# Patient Record
Sex: FEMALE | Race: WHITE | NOT HISPANIC OR LATINO
[De-identification: names, ages, dates, MRNs, and addresses within clinical notes are randomized per-mention and may not be internally consistent; named-entity substitution may affect disease eponyms.]

---

## 2018-05-23 ENCOUNTER — RECORD ABSTRACTING (OUTPATIENT)
Age: 73
End: 2018-05-23

## 2018-05-23 PROBLEM — Z00.00 ENCOUNTER FOR PREVENTIVE HEALTH EXAMINATION: Status: ACTIVE | Noted: 2018-05-23

## 2018-05-23 RX ORDER — FLUTICASONE FUROATE, UMECLIDINIUM BROMIDE AND VILANTEROL TRIFENATATE 200; 62.5; 25 UG/1; UG/1; UG/1
POWDER RESPIRATORY (INHALATION)
Refills: 0 | Status: ACTIVE | COMMUNITY

## 2018-05-23 RX ORDER — PSEUDOEPHEDRINE HCL 30 MG
TABLET ORAL
Refills: 0 | Status: ACTIVE | COMMUNITY

## 2018-05-23 RX ORDER — MULTIVITAMIN
TABLET ORAL
Refills: 0 | Status: ACTIVE | COMMUNITY

## 2018-05-23 RX ORDER — MULTIVIT-MIN/FOLIC/VIT K/LYCOP 400-300MCG
25 MCG TABLET ORAL
Refills: 0 | Status: ACTIVE | COMMUNITY

## 2018-05-23 RX ORDER — CYANOCOBALAMIN (VITAMIN B-12) 500 MCG
400 LOZENGE ORAL
Refills: 0 | Status: ACTIVE | COMMUNITY

## 2018-05-23 RX ORDER — MULTIVIT-MIN/FOLIC/VIT K/LYCOP 400-300MCG
1000 TABLET ORAL
Refills: 0 | Status: ACTIVE | COMMUNITY

## 2018-06-26 ENCOUNTER — APPOINTMENT (OUTPATIENT)
Dept: BREAST CENTER | Facility: CLINIC | Age: 73
End: 2018-06-26
Payer: MEDICARE

## 2018-06-26 VITALS
DIASTOLIC BLOOD PRESSURE: 72 MMHG | SYSTOLIC BLOOD PRESSURE: 127 MMHG | HEIGHT: 65 IN | WEIGHT: 180 LBS | HEART RATE: 75 BPM | BODY MASS INDEX: 29.99 KG/M2

## 2018-06-26 DIAGNOSIS — Z87.19 PERSONAL HISTORY OF OTHER DISEASES OF THE DIGESTIVE SYSTEM: ICD-10-CM

## 2018-06-26 DIAGNOSIS — R92.8 OTHER ABNORMAL AND INCONCLUSIVE FINDINGS ON DIAGNOSTIC IMAGING OF BREAST: ICD-10-CM

## 2018-06-26 DIAGNOSIS — Z86.39 PERSONAL HISTORY OF OTHER ENDOCRINE, NUTRITIONAL AND METABOLIC DISEASE: ICD-10-CM

## 2018-06-26 PROCEDURE — 99215 OFFICE O/P EST HI 40 MIN: CPT

## 2018-06-26 RX ORDER — METHYLPREDNISOLONE 4 MG/1
4 TABLET ORAL
Qty: 21 | Refills: 0 | Status: DISCONTINUED | COMMUNITY
Start: 2018-01-09

## 2018-06-26 RX ORDER — AMOXICILLIN 500 MG/1
500 TABLET, FILM COATED ORAL
Qty: 21 | Refills: 0 | Status: DISCONTINUED | COMMUNITY
Start: 2018-01-09

## 2018-06-26 RX ORDER — CEFDINIR 300 MG/1
300 CAPSULE ORAL
Qty: 20 | Refills: 0 | Status: DISCONTINUED | COMMUNITY
Start: 2018-01-12

## 2018-06-26 RX ORDER — AZITHROMYCIN 250 MG/1
250 TABLET, FILM COATED ORAL
Qty: 6 | Refills: 0 | Status: DISCONTINUED | COMMUNITY
Start: 2018-03-21

## 2018-06-26 RX ORDER — FOSINOPRIL SODIUM 10 MG/1
10 TABLET ORAL
Qty: 90 | Refills: 0 | Status: DISCONTINUED | COMMUNITY
Start: 2018-01-16

## 2018-06-26 RX ORDER — PREDNISONE 20 MG/1
20 TABLET ORAL
Qty: 10 | Refills: 0 | Status: DISCONTINUED | COMMUNITY
Start: 2018-04-24

## 2018-10-11 ENCOUNTER — APPOINTMENT (OUTPATIENT)
Dept: BREAST CENTER | Facility: CLINIC | Age: 73
End: 2018-10-11
Payer: MEDICARE

## 2018-10-11 VITALS
SYSTOLIC BLOOD PRESSURE: 140 MMHG | BODY MASS INDEX: 29.99 KG/M2 | HEART RATE: 90 BPM | OXYGEN SATURATION: 98 % | DIASTOLIC BLOOD PRESSURE: 86 MMHG | WEIGHT: 180 LBS | HEIGHT: 65 IN | RESPIRATION RATE: 18 BRPM

## 2018-10-11 PROCEDURE — 99213 OFFICE O/P EST LOW 20 MIN: CPT

## 2019-03-04 NOTE — REVIEW OF SYSTEMS
[Recent Weight Gain (___ Lbs)] : recent [unfilled] ~Ulb weight gain [Hoarseness] : hoarseness [Shortness Of Breath] : shortness of breath [Wheezing] : wheezing [Heartburn] : heartburn [Negative] : Heme/Lymph

## 2019-03-05 ENCOUNTER — APPOINTMENT (OUTPATIENT)
Dept: BREAST CENTER | Facility: CLINIC | Age: 74
End: 2019-03-05
Payer: MEDICARE

## 2019-03-05 VITALS
WEIGHT: 180 LBS | DIASTOLIC BLOOD PRESSURE: 77 MMHG | SYSTOLIC BLOOD PRESSURE: 135 MMHG | HEIGHT: 65 IN | BODY MASS INDEX: 29.99 KG/M2 | HEART RATE: 86 BPM

## 2019-03-05 PROCEDURE — 99215 OFFICE O/P EST HI 40 MIN: CPT

## 2019-03-05 NOTE — HISTORY OF PRESENT ILLNESS
[FreeTextEntry1] : This is a 73 year old female s/p right partial mastectomy , SNE, IORT and symmetrizing lift (Dr. 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 No residual tumor seen on the re-excision with all new margins >1cm.  The associated DCIS was intermediate grade and 1.6cm. She is s/p whole breast RTX (Dr. Easley). She is on anastrazole (Dr. Walker).  She was treated for chronic bronchitis and has been diagnosed with COPD. She s/p lobectomy (Dr. Michelle) on 10/05/2017. Patient had a PET scan on 09/07/2017, showing intermediate uptake in the right lateral breast, a right ultrasound was done on 12/21/2017. This right usd showed no appreciable change allowing for differences in technique and imaging modality (recent CT Chest) in the lobulated mixed echogenicity mass in the right breast 9:00 4cm from the nipple, favored to reflect fat necrosis/seroma.\par \par Patient was seen by Medical Oncologist (Dr. Walker) on 09/08/2017.  Patient is very anxious regarding her PET/CT results, having had a lesion seen on her lung and then eventually having lung cancer. She was adamant that she didn't have a PET/CT scan in September even after reviewing the written report. She later then reviewed her own calendar and then stated that she did have a PET/CT scan in [facility] and that she was wrong. She is very anxious given her history of lung cancer and breast cancer. \par \par She presents today for follow up and states her right breast pain/soreness is markedly improved on its own. She states she stopped all her vitamins for the last month before her blood work. She would like to know what vitamins to take and that she is very anxious about getting another cancer and would like another PET/CT scan. \par \par She states her right seroma has been tender through the fall and she thinks this is related to her increased exercise she is doing for her lungs.\par She does not perform SBE. \par She has not noticed a change in her breast or a breast lump. No change in right breast seroma. Patient noticed a new right breast lump. \par She has not noticed a change in her nipple or nipple area.\par She has not noticed a change in the skin of the breast.\par She is not experiencing nipple discharge.\par She is not experiencing breast pain. \par She has not noticed a lump or lymph node under the armpit. \par \par BREAST CANCER RISK FACTORS\par Menarche: 13\par Menopause: 41\par Grav:  2    Para: 2\par Age at first live birth: 25\par Nursed: No\par Hysterectomy: No\par Oophorectomy: No\par OCP: Yes x 10y\par HRT: No\par Last pap/pelvic exam: 2012 WNL\par Related family history: No\par Ashkenazi: Yes\par Tyrer-Allison/NCI lifetime risk: n/a \par BRCA testing: Yes -  negative \par \par Last mammogram:    June 13, 2018  Location: [facility]\par Report reviewed.                             \par Results: Birads 2\par No evidence of malignancy\par \par Last ultrasound: June 13, 2018                      Location: [facility] Report reviewed.                          \par Results: Birads 3\par No significant interval change in the oval mixed echogenicity mass in the right breast 9:00 4 cm from the nipple. It measures 4.2 x 1.8 3 cm.Stable since Der 2017. Right breast 7:00 one CM from the nipplesmall round circumscribed mass compatible with small oil cysts, unchanged.\par left breast 4:00 2 cm from the nipple round circumscribed hypoechoic mass measuring 4 mm in diameter unchanged from 2016 compatible with a small oil cyst.\par \par Last MRI:       10/18/18                                          Location: \par report reviewed. \par results:\par BIRADS 2\par No suspicious findings.\par

## 2019-03-05 NOTE — ASSESSMENT
[FreeTextEntry1] : This is a 73-year-old female with history of right breast cancer stage IA invasive ductal carcinoma and DCIS, ASMITA\par Discussed the importance of gynecologic screening\par Continue Arimidex and surveillance per Dr. Walker\par Plan bilateral mammogram ultrasound June 2019\par Advised her to discuss PET/CT scanning as a form of screening with Dr. Walker, I reassured her that her blood work is normal and that this is also a form of screening\par Followup with me June 2019\par rec heating pad and vitamin e oil to her right seroma prn\par will discuss genetic panel testing with holly hanna\par Encouraged her to see a gastroenterologist regarding abdominal bloating, I do not think it is due from Arimidex. She also reports weight gain.\par We reviewed risk reduction strategies including maintaining a BMI <25, limiting red meat intake and alcoholic beverages to 3 per week and exercise (150 min/ week low intensity or 75 min/week high intensity). And maintaining a normal vitamin D level.\par \par She knows to call or return sooner should any concerns or questions arise.\par

## 2019-03-05 NOTE — PHYSICAL EXAM
[Normocephalic] : normocephalic [Atraumatic] : atraumatic [Supple] : supple [No Supraclavicular Adenopathy] : no supraclavicular adenopathy [Examined in the supine and seated position] : examined in the supine and seated position [No dominant masses] : no dominant masses in right breast  [No dominant masses] : no dominant masses left breast [No Nipple Retraction] : no left nipple retraction [No Nipple Discharge] : no left nipple discharge [No Axillary Lymphadenopathy] : no left axillary lymphadenopathy [Soft] : abdomen soft [Not Tender] : non-tender [No Palpable Masses] : no abdominal mass palpated [No Edema] : no edema [No Rashes] : no rashes [No Ulceration] : no ulceration [Symmetrical] : symmetrical [de-identified] : Status post partial mastectomy, moderate brawny induration consistent with radiation changes, lower outer quadrant seroma approximately 3 cm, slightly tender no other palpable masses [de-identified] : healed mastopexy [de-identified] : Healed axillary incision [de-identified] : Very tan

## 2019-03-28 ENCOUNTER — CLINICAL ADVICE (OUTPATIENT)
Age: 74
End: 2019-03-28

## 2019-03-29 ENCOUNTER — TRANSCRIPTION ENCOUNTER (OUTPATIENT)
Age: 74
End: 2019-03-29

## 2019-06-18 ENCOUNTER — APPOINTMENT (OUTPATIENT)
Dept: BREAST CENTER | Facility: CLINIC | Age: 74
End: 2019-06-18
Payer: MEDICARE

## 2019-06-18 VITALS
WEIGHT: 190 LBS | DIASTOLIC BLOOD PRESSURE: 77 MMHG | HEIGHT: 65 IN | SYSTOLIC BLOOD PRESSURE: 122 MMHG | BODY MASS INDEX: 31.65 KG/M2 | HEART RATE: 87 BPM

## 2019-06-18 DIAGNOSIS — Z86.79 PERSONAL HISTORY OF OTHER DISEASES OF THE CIRCULATORY SYSTEM: ICD-10-CM

## 2019-06-18 DIAGNOSIS — T81.9XXA UNSPECIFIED COMPLICATION OF PROCEDURE, INITIAL ENCOUNTER: ICD-10-CM

## 2019-06-18 DIAGNOSIS — D78.33: ICD-10-CM

## 2019-06-18 PROCEDURE — 99215 OFFICE O/P EST HI 40 MIN: CPT

## 2019-06-18 RX ORDER — CITALOPRAM HYDROBROMIDE 20 MG/1
20 TABLET, FILM COATED ORAL DAILY
Refills: 0 | Status: DISCONTINUED | COMMUNITY
End: 2019-06-18

## 2019-06-18 RX ORDER — LISINOPRIL 30 MG/1
TABLET ORAL
Refills: 0 | Status: DISCONTINUED | COMMUNITY
End: 2019-06-18

## 2019-06-18 RX ORDER — HYDROCHLOROTHIAZIDE 12.5 MG/1
12.5 TABLET ORAL
Refills: 0 | Status: ACTIVE | COMMUNITY

## 2019-06-18 RX ORDER — FLUTICASONE PROPION/SALMETEROL 250-50 MCG
250-50 BLISTER, WITH INHALATION DEVICE INHALATION
Refills: 0 | Status: DISCONTINUED | COMMUNITY
End: 2019-06-18

## 2019-06-18 RX ORDER — FLUTICASONE PROPIONATE AND SALMETEROL XINAFOATE 230; 21 UG/1; UG/1
AEROSOL, METERED RESPIRATORY (INHALATION)
Refills: 0 | Status: DISCONTINUED | COMMUNITY
End: 2019-06-18

## 2019-06-18 RX ORDER — AMOXICILLIN AND CLAVULANATE POTASSIUM 875; 125 MG/1; MG/1
875-125 TABLET, COATED ORAL
Qty: 20 | Refills: 0 | Status: DISCONTINUED | COMMUNITY
Start: 2018-11-26 | End: 2019-06-18

## 2019-06-18 RX ORDER — MOMETASONE FUROATE 1 MG/G
0.1 OINTMENT TOPICAL
Qty: 60 | Refills: 0 | Status: ACTIVE | COMMUNITY
Start: 2019-02-19

## 2019-06-18 RX ORDER — LISINOPRIL 10 MG/1
10 TABLET ORAL DAILY
Refills: 0 | Status: DISCONTINUED | COMMUNITY
End: 2019-06-18

## 2019-06-18 RX ORDER — HALOBETASOL PROPIONATE 0.5 MG/G
0.05 OINTMENT TOPICAL DAILY
Refills: 0 | Status: ACTIVE | COMMUNITY

## 2019-06-18 RX ORDER — HYDROCHLOROTHIAZIDE 12.5 MG/1
12.5 TABLET ORAL
Qty: 90 | Refills: 0 | Status: DISCONTINUED | COMMUNITY
Start: 2018-12-20 | End: 2019-06-18

## 2019-06-18 RX ORDER — HYDROCHLOROTHIAZIDE 25 MG/1
25 TABLET ORAL DAILY
Refills: 0 | Status: DISCONTINUED | COMMUNITY
End: 2019-06-18

## 2019-06-18 RX ORDER — FAMOTIDINE 20 MG/1
20 TABLET, FILM COATED ORAL
Qty: 90 | Refills: 0 | Status: ACTIVE | COMMUNITY
Start: 2018-12-20

## 2019-06-18 RX ORDER — PANTOPRAZOLE 40 MG/1
40 TABLET, DELAYED RELEASE ORAL DAILY
Refills: 0 | Status: DISCONTINUED | COMMUNITY
End: 2019-06-18

## 2019-06-18 RX ORDER — TRETINOIN 1 MG/G
0.1 CREAM TOPICAL
Qty: 45 | Refills: 0 | Status: ACTIVE | COMMUNITY
Start: 2018-03-06

## 2019-06-18 RX ORDER — ANASTROZOLE TABLETS 1 MG/1
1 TABLET ORAL DAILY
Refills: 0 | Status: DISCONTINUED | COMMUNITY
End: 2019-06-18

## 2019-06-18 RX ORDER — ASPIRIN ENTERIC COATED TABLETS 81 MG 81 MG/1
81 TABLET, DELAYED RELEASE ORAL DAILY
Refills: 0 | Status: DISCONTINUED | COMMUNITY
End: 2019-06-18

## 2019-06-18 RX ORDER — CITALOPRAM HYDROBROMIDE 20 MG/1
20 TABLET, FILM COATED ORAL
Refills: 0 | Status: DISCONTINUED | COMMUNITY
End: 2019-06-18

## 2019-06-18 RX ORDER — PANTOPRAZOLE 20 MG/1
20 TABLET, DELAYED RELEASE ORAL
Refills: 0 | Status: DISCONTINUED | COMMUNITY
End: 2019-06-18

## 2019-06-18 RX ORDER — VITAMIN B COMPLEX
CAPSULE ORAL
Refills: 0 | Status: ACTIVE | COMMUNITY

## 2019-06-18 NOTE — ASSESSMENT
[FreeTextEntry1] : This is a 74-year-old female with history of right breast cancer stage IA invasive ductal carcinoma and DCIS, ASMITA\par Discussed the importance of gynecologic screening\par Continue Arimidex and surveillance per Dr. Walker, she would like to see Dr. Harry\par Plan bilateral mammogram ultrasound June 2020\par DEC 2019 MRI\par Advised her to discuss PET/CT scanning as a form of screening with med/onc, I reassured her that her blood work is normal and that this is also a form of screening\par Followup with me 1 year, she would like to see me in 6 months\par rec heating pad and vitamin e oil to her right seroma prn\par she is undecided about genetic testing\par Encouraged her to see a gastroenterologist regarding abdominal bloating, I do not think it is due from Arimidex. She also reports weight gain.\par We reviewed risk reduction strategies including maintaining a BMI <25, limiting red meat intake and alcoholic beverages to 3 per week and exercise (150 min/ week low intensity or 75 min/week high intensity). And maintaining a normal vitamin D level.\par \par She knows to call or return sooner should any concerns or questions arise.\par

## 2019-06-18 NOTE — PHYSICAL EXAM
[Normocephalic] : normocephalic [Supple] : supple [Atraumatic] : atraumatic [Examined in the supine and seated position] : examined in the supine and seated position [Symmetrical] : symmetrical [No Supraclavicular Adenopathy] : no supraclavicular adenopathy [No dominant masses] : no dominant masses left breast [No dominant masses] : no dominant masses in right breast  [No Nipple Discharge] : no left nipple discharge [No Nipple Retraction] : no left nipple retraction [No Axillary Lymphadenopathy] : no right axillary lymphadenopathy [Soft] : abdomen soft [Not Tender] : non-tender [No Palpable Masses] : no abdominal mass palpated [No Rashes] : no rashes [No Ulceration] : no ulceration [No Edema] : no edema [de-identified] : healed mastopexy [de-identified] : Healed axillary incision [de-identified] : Status post partial mastectomy, moderate brawny induration consistent with radiation changes, lower outer quadrant seroma approximately 3 cm, slightly tender no other palpable masses [de-identified] : Very tan

## 2019-06-18 NOTE — REVIEW OF SYSTEMS
[Recent Weight Gain (___ Lbs)] : recent [unfilled] ~Ulb weight gain [Heartburn] : heartburn [Lower Ext Edema] : lower extremity edema [Limb Weakness] : limb weakness [Negative] : Respiratory [FreeTextEntry9] : neck pain

## 2019-06-18 NOTE — HISTORY OF PRESENT ILLNESS
[FreeTextEntry1] : This is a 74 year old female s/p right partial mastectomy , SNE, IORT and symmetrizing lift (Dr. 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 No residual tumor seen on the re-excision with all new margins >1cm.  The associated DCIS was intermediate grade and 1.6cm. She is s/p whole breast RTX (Dr. Easley). She is on anastrazole (Dr. Walker).  She was treated for chronic bronchitis and has been diagnosed with COPD. She s/p lobectomy (Dr. Michelle) on 10/05/2017. Patient had a PET scan on 09/07/2017, showing intermediate uptake in the right lateral breast, a right ultrasound was done on 12/21/2017. This right usd showed no appreciable change allowing for differences in technique and imaging modality (recent CT Chest) in the lobulated mixed echogenicity mass in the right breast 9:00 4cm from the nipple, favored to reflect fat necrosis/seroma.\par \par Patient was seen by Medical Oncologist (Dr. Walker) on 02/13/2019.  Patient is very anxious regarding her PET/CT results, having had a lesion seen on her lung and then eventually having lung cancer. She was adamant that she didn't have a PET/CT scan in September even after reviewing the written report. She later then reviewed her own calendar and then stated that she did have a PET/CT scan in [facility] and that she was wrong. She is very anxious given her history of lung cancer and breast cancer. \par \par She presents today for follow up and states her right breast pain/soreness is markedly improved on its own. She states she stopped all her vitamins for the last month before her blood work. She would like to know what vitamins to take and that she is very anxious about getting another cancer and would like another PET/CT scan. \par \par She states her right seroma has been tender through the fall and she thinks this is related to her increased exercise she is doing for her lungs.\par She does not perform SBE. \par She has not noticed a change in her breast or a breast lump. No change in right breast seroma. Patient noticed a new right breast lump. \par She has not noticed a change in her nipple or nipple area.\par She has not noticed a change in the skin of the breast.\par She is not experiencing nipple discharge.\par She is not experiencing breast pain. \par She has not noticed a lump or lymph node under the armpit. \par \par BREAST CANCER RISK FACTORS\par Menarche: 13\par Menopause: 41\par Grav:  2    Para: 2\par Age at first live birth: 25\par Nursed: No\par Hysterectomy: No\par Oophorectomy: No\par OCP: Yes x 10y\par HRT: No\par Last pap/pelvic exam: 2012 WNL\par Related family history: No\par Ashkenazi: Yes\par Tyrer-Allison/NCI lifetime risk: n/a \par BRCA testing: Yes -  negative \par \par Last mammogram:  6/14/2019  Location: [facility]\par Report reviewed.                             \par Results: Birads 2\par dense breast tissue No evidence of malignancy\par \par Last ultrasound: 6/14/2019                      Location: [facility]\par Report reviewed.                          \par Results: Birads 2\par no sonographically appreciated adverse interval changes. right breast 7:00 1 cm from the nipple round circumscribed hypoechoic mass compatible with a small oil cyst, unchanged. 9:00 4 cm from nipple oval lobulated mixed echogenicity mas measuring 3.2 x 2.5 x 2.4 cm not adversely changed since december 2017. left breast 4:00 2 cm from nipple round circumscribed hypoechoic mass measuring 0.4 cm in diameter, unchanged from 2016 and compatible with a small oil cyst\par \par Last MRI:       10/18/18                                          Location: \par report reviewed. \par results:\par BIRADS 2\par No suspicious findings.

## 2019-11-15 ENCOUNTER — APPOINTMENT (OUTPATIENT)
Dept: BREAST CENTER | Facility: CLINIC | Age: 74
End: 2019-11-15
Payer: MEDICARE

## 2019-11-15 VITALS
BODY MASS INDEX: 30.82 KG/M2 | HEIGHT: 65 IN | WEIGHT: 185 LBS | DIASTOLIC BLOOD PRESSURE: 85 MMHG | HEART RATE: 89 BPM | SYSTOLIC BLOOD PRESSURE: 157 MMHG

## 2019-11-15 DIAGNOSIS — N63.13 UNSPECIFIED LUMP IN THE RIGHT BREAST, LOWER OUTER QUADRANT: ICD-10-CM

## 2019-11-15 PROCEDURE — 99215 OFFICE O/P EST HI 40 MIN: CPT

## 2019-11-15 NOTE — PHYSICAL EXAM
[Normocephalic] : normocephalic [Atraumatic] : atraumatic [Supple] : supple [No Supraclavicular Adenopathy] : no supraclavicular adenopathy [Examined in the supine and seated position] : examined in the supine and seated position [Symmetrical] : symmetrical [No dominant masses] : no dominant masses left breast [No Nipple Retraction] : no left nipple retraction [No Nipple Discharge] : no left nipple discharge [No Axillary Lymphadenopathy] : no left axillary lymphadenopathy [Not Tender] : non-tender [Soft] : abdomen soft [No Palpable Masses] : no abdominal mass palpated [No Edema] : no edema [No Rashes] : no rashes [No Ulceration] : no ulceration [de-identified] : Status post partial mastectomy, moderate brawny induration consistent with radiation changes, lower outer quadrant seroma approximately 3 cm, slightly tender no other palpable masses [de-identified] : healed mastopexy [de-identified] : Healed axillary incision [de-identified] : Very tan

## 2019-11-15 NOTE — HISTORY OF PRESENT ILLNESS
[FreeTextEntry1] : This is a 74 year old female s/p right partial mastectomy , SNE, IORT and symmetrizing lift (Dr. 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 No residual tumor seen on the re-excision with all new margins >1cm.  The associated DCIS was intermediate grade and 1.6cm. She is s/p whole breast RTX (Dr. Easley). She is on anastrazole (Dr. Walker).  She was treated for chronic bronchitis and has been diagnosed with COPD. She s/p lobectomy (Dr. Michelle) on 10/05/2017. Patient had a PET scan on 09/07/2017, showing intermediate uptake in the right lateral breast, a right ultrasound was done on 12/21/2017. This right usd showed no appreciable change allowing for differences in technique and imaging modality (recent CT Chest) in the lobulated mixed echogenicity mass in the right breast 9:00 4cm from the nipple, favored to reflect fat necrosis/seroma.\par \par Patient was seen by Medical Oncologist (Dr. Harry) on 7/2/2019.  Patient is very anxious regarding her PET/CT results, having had a lesion seen on her lung and then eventually having lung cancer. She was adamant that she didn't have a PET/CT scan in September even after reviewing the written report. She later then reviewed her own calendar and then stated that she did have a PET/CT scan in [facility] and that she was wrong. She is very anxious given her history of lung cancer and breast cancer. \par \par She presents today for follow up and states her right breast pain/soreness is markedly improved on its own. She states she stopped all her vitamins for the last month before her blood work. She would like to know what vitamins to take and that she is very anxious about getting another cancer and would like another PET/CT scan. \par \par She states her right seroma has been tender through the fall and she thinks this is related to her increased exercise she is doing for her lungs.\par She does not perform SBE. \par She has not noticed a change in her breast or a breast lump. No change in right breast seroma. Patient noticed a new right breast lump. \par She has not noticed a change in her nipple or nipple area.\par She has not noticed a change in the skin of the breast.\par She is not experiencing nipple discharge.\par She is experiencing breast pain. \par She has not noticed a lump or lymph node under the armpit. \par \par BREAST CANCER RISK FACTORS\par Menarche: 13\par Menopause: 41\par Grav:  2    Para: 2\par Age at first live birth: 25\par Nursed: No\par Hysterectomy: No\par Oophorectomy: No\par OCP: Yes x 10y\par HRT: No\par Last pap/pelvic exam: 2012 WNL\par Related family history: No\par Ashkenazi: Yes\par Tyrer-[place]/NCI lifetime risk: n/a \par BRCA testing: Yes -  negative \par \par Last mammogram:  6/14/2019  Location: [facility]\par Report reviewed.                             \par Results: Birads 2\par dense breast tissue No evidence of malignancy\par \par Last ultrasound: 6/14/2019                      Location: [facility]\par Report reviewed.                          \par Results: Birads 2\par no sonographically appreciated adverse interval changes. right breast 7:00 1 cm from the nipple round circumscribed hypoechoic mass compatible with a small oil cyst, unchanged. 9:00 4 cm from nipple oval lobulated mixed echogenicity mas measuring 3.2 x 2.5 x 2.4 cm not adversely changed since december 2017. left breast 4:00 2 cm from nipple round circumscribed hypoechoic mass measuring 0.4 cm in diameter, unchanged from 2016 and compatible with a small oil cyst\par \par Last MRI:       10/18/18                                          Location: \par report reviewed. \par results:\par BIRADS 2\par No suspicious findings.

## 2019-11-15 NOTE — ASSESSMENT
[FreeTextEntry1] : This is a 74-year-old female with history of right breast cancer stage IA invasive ductal carcinoma and DCIS, ASMITA\par Discussed the importance of gynecologic screening\par plan us now to reassure patient.\par Continue Arimidex and surveillance per Dr. Walker, she would like to see Dr. Harry\par Plan bilateral mammogram ultrasound June 2020\par DEC 2019 MRI\par Advised her to discuss PET/CT scanning as a form of screening with med/onc, I reassured her that her blood work is normal and that this is also a form of screening\par Followup with me 1 year, she would like to see me in 6 months\par rec heating pad and vitamin e oil to her right seroma prn\par she is undecided about genetic testing\par Encouraged her to see a gastroenterologist regarding abdominal bloating, I do not think it is due from Arimidex. She also reports weight gain.\par We reviewed risk reduction strategies including maintaining a BMI <25, limiting red meat intake and alcoholic beverages to 3 per week and exercise (150 min/ week low intensity or 75 min/week high intensity). And maintaining a normal vitamin D level.\par \par She knows to call or return sooner should any concerns or questions arise.\par

## 2020-08-05 ENCOUNTER — APPOINTMENT (OUTPATIENT)
Dept: BREAST CENTER | Facility: CLINIC | Age: 75
End: 2020-08-05

## 2021-06-15 ENCOUNTER — NON-APPOINTMENT (OUTPATIENT)
Age: 76
End: 2021-06-15

## 2021-06-15 ENCOUNTER — APPOINTMENT (OUTPATIENT)
Dept: BREAST CENTER | Facility: CLINIC | Age: 76
End: 2021-06-15
Payer: MEDICARE

## 2021-06-15 VITALS
SYSTOLIC BLOOD PRESSURE: 149 MMHG | BODY MASS INDEX: 32.49 KG/M2 | HEIGHT: 65 IN | HEART RATE: 82 BPM | WEIGHT: 195 LBS | DIASTOLIC BLOOD PRESSURE: 79 MMHG

## 2021-06-15 DIAGNOSIS — Z86.59 PERSONAL HISTORY OF OTHER MENTAL AND BEHAVIORAL DISORDERS: ICD-10-CM

## 2021-06-15 PROCEDURE — 99215 OFFICE O/P EST HI 40 MIN: CPT

## 2021-06-15 NOTE — PHYSICAL EXAM
[Normocephalic] : normocephalic [Atraumatic] : atraumatic [Supple] : supple [No Supraclavicular Adenopathy] : no supraclavicular adenopathy [Examined in the supine and seated position] : examined in the supine and seated position [Symmetrical] : symmetrical [No dominant masses] : no dominant masses left breast [No Nipple Retraction] : no left nipple retraction [No Nipple Discharge] : no left nipple discharge [No Axillary Lymphadenopathy] : no left axillary lymphadenopathy [Soft] : abdomen soft [Not Tender] : non-tender [No Palpable Masses] : no abdominal mass palpated [No Edema] : no edema [No Rashes] : no rashes [No Ulceration] : no ulceration [de-identified] : Status post partial mastectomy, moderate brawny induration consistent with radiation changes, lower outer quadrant seroma approximately 3 cm, slightly tender no other palpable masses, tender to IMF [de-identified] : healed mastopexy [de-identified] : Healed axillary incision, in area of patient's pain is her scapula [de-identified] : Very tan

## 2021-06-15 NOTE — HISTORY OF PRESENT ILLNESS
[FreeTextEntry1] : This is a 76 year old female s/p right partial mastectomy , SNE, IORT and symmetrizing lift (Dr. 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 No residual tumor seen on the re-excision with all new margins >1cm.  The associated DCIS was intermediate grade and 1.6cm. She is s/p whole breast RTX (Dr. Easley). She is on anastrazole (Dr. Walker).  She was treated for chronic bronchitis and has been diagnosed with COPD. She s/p lobectomy (Dr. Michelle) on 10/05/2017. Patient had a PET scan on 09/07/2017, showing intermediate uptake in the right lateral breast, a right ultrasound was done on 12/21/2017. This right usd showed no appreciable change allowing for differences in technique and imaging modality (recent CT Chest) in the lobulated mixed echogenicity mass in the right breast 9:00 4cm from the nipple, favored to reflect fat necrosis/seroma.\par \par Patient was seen by Medical Oncologist (Dr. Harry) on 7/2/2019.  Patient is very anxious regarding her PET/CT results, having had a lesion seen on her lung and then eventually having lung cancer. She was adamant that she didn't have a PET/CT scan in September even after reviewing the written report. She later then reviewed her own calendar and then stated that she did have a PET/CT scan in [facility] and that she was wrong. She is very anxious given her history of lung cancer and breast cancer. \par \par She presents today for follow up and states her right breast pain/soreness is markedly improved on its own. She states she stopped all her vitamins for the last month before her blood work. She would like to know what vitamins to take and that she is very anxious about getting another cancer and would like another PET/CT scan. \par \par She states her right seroma has been tender through the fall and she thinks this is related to her increased exercise she is doing for her lungs. She had Pfizer left arm 2/2021. She started Methotrexate 2 months ago. \par \par She does not perform SBE. \par She has not noticed a change in her breast or a breast lump. No change in right breast seroma she states it is more painful and a sharp feeling which resolved about a month ago. Now feels more swollen, worse without a bra.\par She has not noticed a change in her nipple or nipple area.\par She has not noticed a change in the skin of the breast.\par She is not experiencing nipple discharge.\par She is experiencing right breast pain. \par She has not noticed a lump or lymph node under the armpit. \par \par BREAST CANCER RISK FACTORS\par Menarche: 13\par Menopause: 41\par Grav:  2    Para: 2\par Age at first live birth: 25\par Nursed: No\par Hysterectomy: No\par Oophorectomy: No\par OCP: Yes x 10y\par HRT: No\par Last pap/pelvic exam: 2012 WNL\par Related family history: No\par Ashkenazi: Yes\par Tyrer-[place]/NCI lifetime risk: n/a \par BRCA testing: Yes -  negative \par \par Last mammogram: 7/28/2020  Location: [facility]\par Report reviewed.                             \par Results: Birads 2\par dense breast tissue No evidence of malignancy\par \par Last ultrasound: 7/28/2020                      Location: [facility] Report reviewed.                          \par Results: Birads 2\par no evidence of malignancy.\par \par Last MRI:  1/6/2020                                         Location: [facility] report reviewed. \par results: BIRADS 2\par Right breast: Findings compatible with benign fat necrosis in the right upper outer quadrant responding to the lesion described on ultrasonography as 9:00 4cm from the nipple. \par left breast: No suspicious findings.\par

## 2021-06-15 NOTE — ASSESSMENT
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 Walker, she would like to see Dr. Harry and he is no longer there and would like to see Dr. Thurman\par Plan bilateral mammogram ultrasound July 2021\par JAN 2022 MRI\par Advised her to discuss PET/CT scanning as a form of screening with med/onc, I reassured her that her blood work is normal and that this is also a form of screening\par \par she is undecided about genetic testing\par Encouraged her to see a gastroenterologist regarding abdominal bloating, I do not think it is due from Arimidex. She also reports weight gain.\par We reviewed risk reduction strategies including maintaining a BMI <25, limiting red meat intake and alcoholic beverages to 3 per week and exercise (150 min/ week low intensity or 75 min/week high intensity). And maintaining a normal vitamin D level.\par f/u 6 months\par She knows to call or return sooner should any concerns or questions arise.\par

## 2021-12-14 ENCOUNTER — APPOINTMENT (OUTPATIENT)
Dept: BREAST CENTER | Facility: CLINIC | Age: 76
End: 2021-12-14
Payer: MEDICARE

## 2021-12-14 VITALS
DIASTOLIC BLOOD PRESSURE: 82 MMHG | HEART RATE: 90 BPM | HEIGHT: 65 IN | WEIGHT: 192 LBS | SYSTOLIC BLOOD PRESSURE: 142 MMHG | BODY MASS INDEX: 31.99 KG/M2

## 2021-12-14 DIAGNOSIS — C34.90 MALIGNANT NEOPLASM OF UNSPECIFIED PART OF UNSPECIFIED BRONCHUS OR LUNG: ICD-10-CM

## 2021-12-14 PROCEDURE — 99214 OFFICE O/P EST MOD 30 MIN: CPT

## 2021-12-14 RX ORDER — METHOTREXATE 2.5 MG/1
TABLET ORAL
Refills: 0 | Status: DISCONTINUED | COMMUNITY
End: 2021-12-14

## 2021-12-14 NOTE — ASSESSMENT
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 Walker, she would like to see Dr. Harry and he is no longer there and would like to see Dr. Thurman\par Plan bilateral mammogram ultrasound AUG 2022 discussed ALANA she is amenable\par JAN 2022 MRI-unable to tolerate today\par Advised her to discuss PET/CT scanning as a form of screening with med/onc, I reassured her that her blood work is normal and that this is also a form of screening\par \par she is undecided about genetic testing\par Encouraged her to see a gastroenterologist regarding abdominal bloating, I do not think it is due from Arimidex. She also reports weight gain.\par We reviewed risk reduction strategies including maintaining a BMI <25, limiting red meat intake and alcoholic beverages to 3 per week and exercise (150 min/ week low intensity or 75 min/week high intensity). And maintaining a normal vitamin D level.\par f/u after august imaging\par She knows to call or return sooner should any concerns or questions arise.\par

## 2021-12-14 NOTE — PHYSICAL EXAM
[Normocephalic] : normocephalic [Atraumatic] : atraumatic [Supple] : supple [No Supraclavicular Adenopathy] : no supraclavicular adenopathy [Examined in the supine and seated position] : examined in the supine and seated position [Symmetrical] : symmetrical [No dominant masses] : no dominant masses left breast [No Nipple Retraction] : no left nipple retraction [No Nipple Discharge] : no left nipple discharge [No Axillary Lymphadenopathy] : no left axillary lymphadenopathy [Soft] : abdomen soft [Not Tender] : non-tender [No Palpable Masses] : no abdominal mass palpated [No Edema] : no edema [No Rashes] : no rashes [No Ulceration] : no ulceration [de-identified] : Status post partial mastectomy, moderate brawny induration consistent with radiation changes, lower outer quadrant seroma approximately 3 cm, slightly tender no other palpable masses, tender to IMF [de-identified] : healed mastopexy [de-identified] : Healed axillary incision, in area of patient's pain is her scapula [de-identified] : Very tan

## 2021-12-14 NOTE — HISTORY OF PRESENT ILLNESS
[FreeTextEntry1] : This is a 76 year old female s/p right partial mastectomy , SNE, IORT and symmetrizing lift (Dr. Butt) on 4/17/2014 with re-excision for close margins on 5/8/2014, for a stage 1a invasive duct carcinoma associated with DCIS, T1b(6mm)N0(sn,ihc-),  4/9, no LVI, ER+, [place]+, Her2-, Ki67 low. No residual tumor seen on the re-excision with all new margins >1cm.  The associated DCIS was intermediate grade and 1.6cm. She is s/p whole breast RTX (Dr. Easley). She is on anastrazole (Dr. Walker).  She was treated for chronic bronchitis and has been diagnosed with COPD. She s/p lobectomy (Dr. Michelle) on 10/05/2017. Patient had a PET scan on 09/07/2017, showing intermediate uptake in the right lateral breast, a right ultrasound was done on 12/21/2017. This right usd showed no appreciable change allowing for differences in technique and imaging modality (recent CT Chest) in the lobulated mixed echogenicity mass in the right breast 9:00 4cm from the nipple, favored to reflect fat necrosis/seroma.\par \par Patient was seen by Medical Oncologist (Dr. Harry) on 7/2/2019.  Patient is very anxious regarding her PET/CT results, having had a lesion seen on her lung and then eventually having lung cancer. She was adamant that she didn't have a PET/CT scan in September even after reviewing the written report. She later then reviewed her own calendar and then stated that she did have a PET/CT scan in [facility] and that she was wrong. She is very anxious given her history of lung cancer and breast cancer. \par \par She presents today for follow up and states her right breast pain/soreness is markedly improved on its own. She states she stopped all her vitamins for the last month before her blood work. She would like to know what vitamins to take and that she is very anxious about getting another cancer and would like another PET/CT scan. \par \par She states her right seroma has been tender through the fall and she thinks this is related to her increased exercise she is doing for her lungs. She had Pfizer left arm 2/2021. She started Methotrexate 2 months ago. \par \par She does not perform SBE. \par She has not noticed a change in her breast or a breast lump. No change in right breast seroma she states it is more painful and a sharp feeling which resolved about a month ago. Now feels more swollen, worse without a bra.\par She has not noticed a change in her nipple or nipple area.\par She has not noticed a change in the skin of the breast.\par She is not experiencing nipple discharge.\par She is experiencing right breast pain. \par She has not noticed a lump or lymph node under the armpit. \par \par BREAST CANCER RISK FACTORS\par Menarche: 13\par Menopause: 41\par Grav:  2    Para: 2\par Age at first live birth: 25\par Nursed: No\par Hysterectomy: No\par Oophorectomy: No\par OCP: Yes x 10y\par HRT: No\par Last pap/pelvic exam: 2012 WNL\par Related family history: No\par Ashkenazi: Yes\par Tyrer-Granbury/NCI lifetime risk: n/a \par BRCA testing: Yes -  negative \par \par Last mammogram: 8/4/2021                   Location: [place] \par Report reviewed.                             \par Results: Birads 2\par dense breast tissue No evidence of malignancy\par \par Last ultrasound: 8/4/2021                      Location: [place]\par Report reviewed.                          \par Results: Birads 2\par no evidence of malignancy.\par \par Last MRI:  1/6/2020                                         Location: [facility]\par report reviewed. \par results: BIRADS 2\par Right breast: Findings compatible with benign fat necrosis in the right upper outer quadrant responding to the lesion described on ultrasonography as 9:00 4cm from the nipple. \par left breast: No suspicious findings.\par

## 2022-08-05 ENCOUNTER — NON-APPOINTMENT (OUTPATIENT)
Age: 77
End: 2022-08-05

## 2022-08-17 ENCOUNTER — RESULT REVIEW (OUTPATIENT)
Age: 77
End: 2022-08-17

## 2022-08-22 DIAGNOSIS — R92.8 OTHER ABNORMAL AND INCONCLUSIVE FINDINGS ON DIAGNOSTIC IMAGING OF BREAST: ICD-10-CM

## 2022-10-28 ENCOUNTER — APPOINTMENT (OUTPATIENT)
Dept: RHEUMATOLOGY | Facility: CLINIC | Age: 77
End: 2022-10-28

## 2022-10-28 VITALS
OXYGEN SATURATION: 98 % | WEIGHT: 198 LBS | HEIGHT: 65 IN | DIASTOLIC BLOOD PRESSURE: 62 MMHG | BODY MASS INDEX: 32.99 KG/M2 | SYSTOLIC BLOOD PRESSURE: 150 MMHG | HEART RATE: 47 BPM

## 2022-10-28 PROCEDURE — 99205 OFFICE O/P NEW HI 60 MIN: CPT

## 2022-10-28 NOTE — PHYSICAL EXAM
[General Appearance - Alert] : alert [Outer Ear] : the ears and nose were normal in appearance [Neck Appearance] : the appearance of the neck was normal [] : no respiratory distress [Auscultation Breath Sounds / Voice Sounds] : lungs were clear to auscultation bilaterally [Heart Rate And Rhythm] : heart rate was normal and rhythm regular [Heart Sounds] : normal S1 and S2 [Edema] : there was no peripheral edema [Abnormal Walk] : normal gait [Nail Clubbing] : no clubbing  or cyanosis of the fingernails [Musculoskeletal - Swelling] : no joint swelling seen [Motor Tone] : muscle strength and tone were normal [FreeTextEntry1] : mild psoriatic plaques with desquamation over the elbows and knees b/l [No Focal Deficits] : no focal deficits [Oriented To Time, Place, And Person] : oriented to person, place, and time

## 2022-10-28 NOTE — HISTORY OF PRESENT ILLNESS
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 she was diagnosed with scleritis\par she was treated with prednisone, the scleritis went in to remission but after taper symptoms returned.\par she was started on MTX which controlled symptoms and allowed steroid reduction.\par prednisone and mtx were discontinued after a period of remission but few months ago ophto found scleritis on exam after she developed eye redness and pain. was started on prednisone. \par referred to the office for DMARD strategy\par \par Psoriasis:\par diagnosed 30 years ago\par limited to the extensor surface of the arms and knee\par no arthritis involvement\par

## 2022-12-16 ENCOUNTER — NON-APPOINTMENT (OUTPATIENT)
Age: 77
End: 2022-12-16

## 2022-12-29 ENCOUNTER — RX RENEWAL (OUTPATIENT)
Age: 77
End: 2022-12-29

## 2023-01-23 ENCOUNTER — RX RENEWAL (OUTPATIENT)
Age: 78
End: 2023-01-23

## 2023-02-15 ENCOUNTER — APPOINTMENT (OUTPATIENT)
Dept: RHEUMATOLOGY | Facility: CLINIC | Age: 78
End: 2023-02-15
Payer: MEDICARE

## 2023-02-15 VITALS
HEIGHT: 65 IN | SYSTOLIC BLOOD PRESSURE: 136 MMHG | OXYGEN SATURATION: 98 % | DIASTOLIC BLOOD PRESSURE: 72 MMHG | WEIGHT: 202 LBS | HEART RATE: 77 BPM | BODY MASS INDEX: 33.66 KG/M2

## 2023-02-15 DIAGNOSIS — L40.9 PSORIASIS, UNSPECIFIED: ICD-10-CM

## 2023-02-15 DIAGNOSIS — H15.009 UNSPECIFIED SCLERITIS, UNSPECIFIED EYE: ICD-10-CM

## 2023-02-15 PROCEDURE — 99214 OFFICE O/P EST MOD 30 MIN: CPT

## 2023-02-15 RX ORDER — FOLIC ACID 1 MG/1
1 TABLET ORAL DAILY
Qty: 1 | Refills: 2 | Status: ACTIVE | COMMUNITY
Start: 2022-10-28 | End: 1900-01-01

## 2023-02-15 RX ORDER — METHOTREXATE 2.5 MG/1
2.5 TABLET ORAL
Qty: 24 | Refills: 3 | Status: ACTIVE | COMMUNITY
Start: 2022-10-28 | End: 1900-01-01

## 2023-02-15 RX ORDER — ASPIRIN 81 MG/1
81 TABLET, CHEWABLE ORAL
Refills: 0 | Status: DISCONTINUED | COMMUNITY
End: 2023-02-15

## 2023-02-15 NOTE — PHYSICAL EXAM
[General Appearance - Alert] : alert [Extraocular Movements] : extraocular movements were intact [Outer Ear] : the ears and nose were normal in appearance [Neck Appearance] : the appearance of the neck was normal [] : no respiratory distress [Auscultation Breath Sounds / Voice Sounds] : lungs were clear to auscultation bilaterally [Heart Rate And Rhythm] : heart rate was normal and rhythm regular [Heart Sounds] : normal S1 and S2 [Edema] : there was no peripheral edema [Abnormal Walk] : normal gait [Nail Clubbing] : no clubbing  or cyanosis of the fingernails [Musculoskeletal - Swelling] : no joint swelling seen [Motor Tone] : muscle strength and tone were normal [FreeTextEntry1] : mild psoriatic plaques with desquamation over the elbows and knees b/l [No Focal Deficits] : no focal deficits [Oriented To Time, Place, And Person] : oriented to person, place, and time

## 2023-02-15 NOTE — HISTORY OF PRESENT ILLNESS
[___ Month(s) Ago] : [unfilled] month(s) ago [FreeTextEntry1] : patient today feels well\par some plaque of psoriais over elbow extensor surface\par Optho exam few weeks ago reported to have improvement of scleritis.\par On MTX 10 mg + Prednisone 5 mg\par Optho Office: dr. Gale [phone]

## 2023-02-20 ENCOUNTER — RESULT REVIEW (OUTPATIENT)
Age: 78
End: 2023-02-20

## 2023-02-21 ENCOUNTER — APPOINTMENT (OUTPATIENT)
Dept: BREAST CENTER | Facility: CLINIC | Age: 78
End: 2023-02-21
Payer: MEDICARE

## 2023-02-21 VITALS
WEIGHT: 198 LBS | HEART RATE: 91 BPM | DIASTOLIC BLOOD PRESSURE: 78 MMHG | HEIGHT: 65 IN | SYSTOLIC BLOOD PRESSURE: 134 MMHG | BODY MASS INDEX: 32.99 KG/M2

## 2023-02-21 PROCEDURE — 99214 OFFICE O/P EST MOD 30 MIN: CPT

## 2023-02-21 RX ORDER — ANASTROZOLE TABLETS 1 MG/1
1 TABLET ORAL
Refills: 0 | Status: DISCONTINUED | COMMUNITY
End: 2023-02-21

## 2023-02-21 NOTE — CONSULT LETTER
[Dear  ___] : Dear  [unfilled], [Courtesy Letter:] : I had the pleasure of seeing your patient, [unfilled], in my office today. [Please see my note below.] : Please see my note below. [Consult Closing:] : Thank you very much for allowing me to participate in the care of this patient.  If you have any questions, please do not hesitate to contact me. [Sincerely,] : Sincerely, [FreeTextEntry3] : Marge Bergeron MS DO\par Breast Surgeon\par [facility] \[place] [address]\par

## 2023-02-21 NOTE — ASSESSMENT
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 Thurman\par Plan bilateral mammogram ultrasound AUG 2022 discussed ALANA she is amenable\par \par Advised her to discuss PET/CT scanning as a form of screening with med/onc, I reassured her that her blood work is normal and that this is also a form of screening\par \par she is undecided about genetic testing\par \par We reviewed risk reduction strategies including maintaining a BMI <25, limiting red meat intake and alcoholic beverages to 3 per week and exercise (150 min/ week low intensity or 75 min/week high intensity). And maintaining a normal vitamin D level.\par f/u 1 year for CBE\par She knows to call or return sooner should any concerns or questions arise.\par

## 2023-02-21 NOTE — PHYSICAL EXAM
[Normocephalic] : normocephalic [Atraumatic] : atraumatic [Supple] : supple [No Supraclavicular Adenopathy] : no supraclavicular adenopathy [Examined in the supine and seated position] : examined in the supine and seated position [Symmetrical] : symmetrical [No dominant masses] : no dominant masses left breast [No Nipple Retraction] : no left nipple retraction [No Nipple Discharge] : no left nipple discharge [No Axillary Lymphadenopathy] : no left axillary lymphadenopathy [Soft] : abdomen soft [Not Tender] : non-tender [No Palpable Masses] : no abdominal mass palpated [No Edema] : no edema [No Rashes] : no rashes [No Ulceration] : no ulceration [de-identified] : Status post partial mastectomy, moderate brawny induration consistent with radiation changes, lower outer quadrant seroma approximately 3 cm, slightly tender no other palpable masses [de-identified] : healed mastopexy [de-identified] : Healed axillary incision, in area of patient's pain is her scapula [de-identified] : Very tan

## 2023-02-21 NOTE — HISTORY OF PRESENT ILLNESS
[FreeTextEntry1] : This is a 77 year old female s/p right partial mastectomy , SNE, IORT and symmetrizing lift (Dr. Butt) on 4/17/2014 with re-excision for close margins on 5/8/2014, for a stage 1a invasive duct carcinoma associated with DCIS, T1b(6mm)N0(sn,ihc-),  4/9, no LVI, ER+, [place]+, Her2-, Ki67 low. No residual tumor seen on the re-excision with all new margins >1cm.  The associated DCIS was intermediate grade and 1.6cm. She is s/p whole breast RTX (Dr. Easley). She is on anastrazole (Dr. Walker).  She was treated for chronic bronchitis and has been diagnosed with COPD. She s/p lobectomy (Dr. Michelle) on 10/05/2017. Patient had a PET scan on 09/07/2017, showing intermediate uptake in the right lateral breast, a right ultrasound was done on 12/21/2017. This right usd showed no appreciable change allowing for differences in technique and imaging modality (recent CT Chest) in the lobulated mixed echogenicity mass in the right breast 9:00 4cm from the nipple, favored to reflect fat necrosis/seroma.\par \par Patient was seen by Medical Oncologist (Dr. Harry) on 7/2/2019.  Patient is very anxious regarding her PET/CT results, having had a lesion seen on her lung and then eventually having lung cancer. She was adamant that she didn't have a PET/CT scan in September even after reviewing the written report. She later then reviewed her own calendar and then stated that she did have a PET/CT scan in [facility] and that she was wrong. She is very anxious given her history of lung cancer and breast cancer. \par \par She presents today for follow up and states her right breast pain/soreness is markedly improved on its own. She states she stopped all her vitamins for the last month before her blood work. She would like to know what vitamins to take and that she is very anxious about getting another cancer and would like another PET/CT scan. \par \par She states her right seroma has been tender through the fall and she thinks this is related to her increased exercise she is doing for her lungs. She had Pfizer left arm 2/2021. She started Methotrexate 2 months ago. \par \par She does not perform SBE. \par She has not noticed a change in her breast or a breast lump. No change in right breast seroma she states it is more tender.\par She has not noticed a change in her nipple or nipple area.\par She has not noticed a change in the skin of the breast.\par She is not experiencing nipple discharge.\par She is experiencing right breast pain. \par She has not noticed a lump or lymph node under the armpit. \par \par BREAST CANCER RISK FACTORS\par Menarche: 13\par Menopause: 41\par Grav:  2    Para: 2\par Age at first live birth: 25\par Nursed: No\par Hysterectomy: No\par Oophorectomy: No\par OCP: Yes x 10y\par HRT: No\par Last pap/pelvic exam: 2012 WNL\par Related family history: No\par Ashkenazi: Yes\par Tyrer-Allison/NCI lifetime risk: n/a \par BRCA testing: Yes -  negative \par \par Last mammogram: 2/21/2023                   Location: [place] \par Report reviewed.                             \par Results: BI-RADS 2\par There is calcification associated with benign fat necrosis at the right UOQ surgical site. No mammographic evidence of malignancy.\par \par Last ultrasound: 8/18/2022                     Location: [facility]\par Report reviewed.                          \par Results: Birads 2\par no evidence of malignancy.\par \par Last MRI:  1/6/2020                                         Location: [facility]\par report reviewed. \par results: BIRADS 2\par Right breast: Findings compatible with benign fat necrosis in the right upper outer quadrant responding to the lesion described on ultrasonography as 9:00 4cm from the nipple. \par left breast: No suspicious findings.\par

## 2023-02-27 ENCOUNTER — RX RENEWAL (OUTPATIENT)
Age: 78
End: 2023-02-27

## 2023-04-25 ENCOUNTER — RX RENEWAL (OUTPATIENT)
Age: 78
End: 2023-04-25

## 2023-04-25 ENCOUNTER — NON-APPOINTMENT (OUTPATIENT)
Age: 78
End: 2023-04-25

## 2023-05-16 ENCOUNTER — APPOINTMENT (OUTPATIENT)
Dept: RHEUMATOLOGY | Facility: CLINIC | Age: 78
End: 2023-05-16

## 2023-05-23 ENCOUNTER — RX RENEWAL (OUTPATIENT)
Age: 78
End: 2023-05-23

## 2023-07-11 ENCOUNTER — NON-APPOINTMENT (OUTPATIENT)
Age: 78
End: 2023-07-11

## 2023-07-12 ENCOUNTER — NON-APPOINTMENT (OUTPATIENT)
Age: 78
End: 2023-07-12

## 2023-07-31 ENCOUNTER — RESULT REVIEW (OUTPATIENT)
Age: 78
End: 2023-07-31

## 2024-02-27 ENCOUNTER — APPOINTMENT (OUTPATIENT)
Dept: BREAST CENTER | Facility: CLINIC | Age: 79
End: 2024-02-27
Payer: MEDICARE

## 2024-02-27 VITALS
BODY MASS INDEX: 31.32 KG/M2 | HEART RATE: 88 BPM | WEIGHT: 188 LBS | SYSTOLIC BLOOD PRESSURE: 144 MMHG | DIASTOLIC BLOOD PRESSURE: 82 MMHG | HEIGHT: 65 IN

## 2024-02-27 DIAGNOSIS — N60.19 DIFFUSE CYSTIC MASTOPATHY OF UNSPECIFIED BREAST: ICD-10-CM

## 2024-02-27 DIAGNOSIS — C50.812 MALIGNANT NEOPLASM OF OVERLAPPING SITES OF RIGHT FEMALE BREAST: ICD-10-CM

## 2024-02-27 DIAGNOSIS — Z92.3 PERSONAL HISTORY OF IRRADIATION: ICD-10-CM

## 2024-02-27 DIAGNOSIS — Z17.0 MALIGNANT NEOPLASM OF OVERLAPPING SITES OF RIGHT FEMALE BREAST: ICD-10-CM

## 2024-02-27 DIAGNOSIS — C50.811 MALIGNANT NEOPLASM OF OVERLAPPING SITES OF RIGHT FEMALE BREAST: ICD-10-CM

## 2024-02-27 DIAGNOSIS — Z12.31 ENCOUNTER FOR SCREENING MAMMOGRAM FOR MALIGNANT NEOPLASM OF BREAST: ICD-10-CM

## 2024-02-27 DIAGNOSIS — C50.419 MALIGNANT NEOPLASM OF UPPER-OUTER QUADRANT OF UNSPECIFIED FEMALE BREAST: ICD-10-CM

## 2024-02-27 DIAGNOSIS — R92.30 DENSE BREASTS, UNSPECIFIED: ICD-10-CM

## 2024-02-27 PROCEDURE — 99214 OFFICE O/P EST MOD 30 MIN: CPT

## 2024-02-27 RX ORDER — LOSARTAN POTASSIUM 50 MG/1
50 TABLET, FILM COATED ORAL
Refills: 0 | Status: ACTIVE | COMMUNITY

## 2024-02-27 RX ORDER — DULOXETINE HYDROCHLORIDE 30 MG/1
30 CAPSULE, DELAYED RELEASE PELLETS ORAL
Refills: 0 | Status: ACTIVE | COMMUNITY

## 2024-02-27 RX ORDER — PREDNISONE 5 MG/1
5 TABLET ORAL
Qty: 60 | Refills: 0 | Status: DISCONTINUED | COMMUNITY
Start: 2022-12-05 | End: 2024-02-27

## 2024-02-27 RX ORDER — VIT A/VIT C/VIT E/ZINC/COPPER 2148-113
TABLET ORAL
Refills: 0 | Status: ACTIVE | COMMUNITY

## 2024-02-27 NOTE — CONSULT LETTER
[Dear  ___] : Dear  [unfilled], [Courtesy Letter:] : I had the pleasure of seeing your patient, [unfilled], in my office today. [Please see my note below.] : Please see my note below. [Consult Closing:] : Thank you very much for allowing me to participate in the care of this patient.  If you have any questions, please do not hesitate to contact me. [Sincerely,] : Sincerely, [FreeTextEntry3] : Marge Bergeron MS DO\par  Breast Surgeon\par  [facility] [place] [address]\par

## 2024-02-27 NOTE — HISTORY OF PRESENT ILLNESS
[FreeTextEntry1] : This is a 78 year old female s/p right partial mastectomy , SNE, IORT and symmetrizing lift (Dr. Butt) on 4/17/2014 with re-excision for close margins on 5/8/2014, for a stage 1a invasive duct carcinoma associated with DCIS, T1b(6mm)N0(sn,ihc-),  4/9, no LVI, ER+, [place]+, Her2-, Ki67 low. No residual tumor seen on the re-excision with all new margins >1cm.  The associated DCIS was intermediate grade and 1.6cm. She is s/p whole breast RTX (Dr. Easley). She completed 7 years of anastrazole (Dr. Walker).  She was treated for chronic bronchitis and has been diagnosed with COPD. She s/p lobectomy (Dr. Michelle) on 10/05/2017. Patient had a PET scan on 09/07/2017, showing intermediate uptake in the right lateral breast, a right ultrasound was done on 12/21/2017. This right usd showed no appreciable change allowing for differences in technique and imaging modality (recent CT Chest) in the lobulated mixed echogenicity mass in the right breast 9:00 4cm from the nipple, favored to reflect fat necrosis/seroma.  Patient was seen by Medical Oncologist (Dr. Harry) on 7/2/2019.  Patient is very anxious regarding her PET/CT results, having had a lesion seen on her lung and then eventually having lung cancer. She was adamant that she didn't have a PET/CT scan in September even after reviewing the written report. She later then reviewed her own calendar and then stated that she did have a PET/CT scan in [facility] and that she was wrong. She is very anxious given her history of lung cancer and breast cancer.   She presents today for follow up and states her right breast pain/soreness is markedly improved on its own. She states she stopped all her vitamins for the last month before her blood work. She would like to know what vitamins to take and that she is very anxious about getting another cancer and would like another PET/CT scan.   She states her right seroma has been tender through the fall and she thinks this is related to her increased exercise she is doing for her lungs. She had Pfizer left arm 2/2021.   She rescued a pito [place]el[place] and is very happy. She had extensive work up for visual loss but thinks it is better.  She does not perform SBE.  She has not noticed a change in her breast or a breast lump. No change in right breast seroma she states it is more tender. She has not noticed a change in her nipple or nipple area. She has not noticed a change in the skin of the breast. She is not experiencing nipple discharge. She is experiencing right breast pain.  She has not noticed a lump or lymph node under the armpit.   BREAST CANCER RISK FACTORS Menarche: 13 Menopause: 41 Grav:  2    Para: 2 Age at first live birth: 25 Nursed: No Hysterectomy: No Oophorectomy: No OCP: Yes x 10y HRT: No Last pap/pelvic exam: 2012 WNL Related family history: No Ashkenazi: Yes Kelley-Allison/CHARLES lifetime risk: n/a  BRCA testing: Yes -  negative   Last mammogram: 08/01/2023                 Location: [facility] Report reviewed.                              Results: BI-RADS 2 No mammographic evidence of malignancy.  Last ultrasound: 08/01/2023                   Location: [facility] Report reviewed.                           Results: Birads 2 no evidence of malignancy.  Last MRI:  1/6/2020                                         Location: [facility] report reviewed.  results: BIRADS 2 Right breast: Findings compatible with benign fat necrosis in the right upper outer quadrant responding to the lesion described on ultrasonography as 9:00 4cm from the nipple.  left breast: No suspicious findings.

## 2024-02-27 NOTE — ASSESSMENT
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 Thurman Plan bilateral mammogram ultrasound AUG 2024 ALANA she is amenable  We reviewed risk reduction strategies including maintaining a BMI <25, limiting red meat intake and alcoholic beverages to 3 per week and exercise (150 min/ week low intensity or 75 min/week high intensity). And maintaining a normal vitamin D level. f/u 1 year for CBE She knows to call or return sooner should any concerns or questions arise.

## 2024-02-27 NOTE — PHYSICAL EXAM
[Normocephalic] : normocephalic [Atraumatic] : atraumatic [Supple] : supple [No Supraclavicular Adenopathy] : no supraclavicular adenopathy [Examined in the supine and seated position] : examined in the supine and seated position [Symmetrical] : symmetrical [No dominant masses] : no dominant masses left breast [No Nipple Retraction] : no left nipple retraction [No Nipple Discharge] : no left nipple discharge [No Axillary Lymphadenopathy] : no left axillary lymphadenopathy [Soft] : abdomen soft [Not Tender] : non-tender [No Palpable Masses] : no abdominal mass palpated [No Edema] : no edema [No Rashes] : no rashes [No Ulceration] : no ulceration [de-identified] : Status post partial mastectomy, moderate brawny induration consistent with radiation changes, lower outer quadrant seroma approximately 3 cm, slightly tender no other palpable masses [de-identified] : healed mastopexy [de-identified] : Healed axillary incision, in area of patient's pain is her scapula [de-identified] : Very tan

## 2024-07-31 ENCOUNTER — RESULT REVIEW (OUTPATIENT)
Age: 79
End: 2024-07-31

## 2024-08-02 ENCOUNTER — NON-APPOINTMENT (OUTPATIENT)
Age: 79
End: 2024-08-02

## 2025-02-25 ENCOUNTER — APPOINTMENT (OUTPATIENT)
Dept: BREAST CENTER | Facility: CLINIC | Age: 80
End: 2025-02-25
Payer: MEDICARE

## 2025-02-25 VITALS
WEIGHT: 195 LBS | HEART RATE: 79 BPM | DIASTOLIC BLOOD PRESSURE: 69 MMHG | SYSTOLIC BLOOD PRESSURE: 127 MMHG | BODY MASS INDEX: 32.49 KG/M2 | HEIGHT: 65 IN

## 2025-02-25 DIAGNOSIS — R92.30 DENSE BREASTS, UNSPECIFIED: ICD-10-CM

## 2025-02-25 DIAGNOSIS — Z12.31 ENCOUNTER FOR SCREENING MAMMOGRAM FOR MALIGNANT NEOPLASM OF BREAST: ICD-10-CM

## 2025-02-25 DIAGNOSIS — M48.00 SPINAL STENOSIS, SITE UNSPECIFIED: ICD-10-CM

## 2025-02-25 DIAGNOSIS — N60.19 DIFFUSE CYSTIC MASTOPATHY OF UNSPECIFIED BREAST: ICD-10-CM

## 2025-02-25 DIAGNOSIS — C50.812 MALIGNANT NEOPLASM OF OVERLAPPING SITES OF RIGHT FEMALE BREAST: ICD-10-CM

## 2025-02-25 DIAGNOSIS — C50.419 MALIGNANT NEOPLASM OF UPPER-OUTER QUADRANT OF UNSPECIFIED FEMALE BREAST: ICD-10-CM

## 2025-02-25 DIAGNOSIS — C50.811 MALIGNANT NEOPLASM OF OVERLAPPING SITES OF RIGHT FEMALE BREAST: ICD-10-CM

## 2025-02-25 DIAGNOSIS — Z17.0 MALIGNANT NEOPLASM OF OVERLAPPING SITES OF RIGHT FEMALE BREAST: ICD-10-CM

## 2025-02-25 DIAGNOSIS — Z92.3 PERSONAL HISTORY OF IRRADIATION: ICD-10-CM

## 2025-02-25 DIAGNOSIS — R92.2 INCONCLUSIVE MAMMOGRAM: ICD-10-CM

## 2025-02-25 PROCEDURE — 99214 OFFICE O/P EST MOD 30 MIN: CPT

## 2025-02-25 RX ORDER — METFORMIN HYDROCHLORIDE 750 MG/1
TABLET ORAL
Refills: 0 | Status: ACTIVE | COMMUNITY

## 2025-08-05 ENCOUNTER — RESULT REVIEW (OUTPATIENT)
Age: 80
End: 2025-08-05

## 2025-08-05 ENCOUNTER — NON-APPOINTMENT (OUTPATIENT)
Age: 80
End: 2025-08-05

## 2025-08-19 ENCOUNTER — NON-APPOINTMENT (OUTPATIENT)
Age: 80
End: 2025-08-19

## 2025-08-19 DIAGNOSIS — Z71.89 OTHER SPECIFIED COUNSELING: ICD-10-CM
